# Patient Record
Sex: FEMALE | Race: WHITE | NOT HISPANIC OR LATINO | RURAL
[De-identification: names, ages, dates, MRNs, and addresses within clinical notes are randomized per-mention and may not be internally consistent; named-entity substitution may affect disease eponyms.]

---

## 2021-08-12 ENCOUNTER — WEB ENCOUNTER (OUTPATIENT)
Dept: RURAL CLINIC 6 | Facility: CLINIC | Age: 41
End: 2021-08-12

## 2021-08-12 ENCOUNTER — DASHBOARD ENCOUNTERS (OUTPATIENT)
Age: 41
End: 2021-08-12

## 2021-08-12 ENCOUNTER — OFFICE VISIT (OUTPATIENT)
Dept: RURAL CLINIC 2 | Facility: CLINIC | Age: 41
End: 2021-08-12
Payer: SELF-PAY

## 2021-08-12 DIAGNOSIS — R94.5 ABNORMAL LFTS: ICD-10-CM

## 2021-08-12 DIAGNOSIS — K52.9 IBD (INFLAMMATORY BOWEL DISEASE): ICD-10-CM

## 2021-08-12 DIAGNOSIS — K51.80 OTHER ULCERATIVE COLITIS WITHOUT COMPLICATION: ICD-10-CM

## 2021-08-12 PROBLEM — 64766004: Status: ACTIVE | Noted: 2021-08-12

## 2021-08-12 PROCEDURE — 99214 OFFICE O/P EST MOD 30 MIN: CPT | Performed by: INTERNAL MEDICINE

## 2021-08-12 RX ORDER — SERTRALINE HYDROCHLORIDE 100 MG/1
1 TABLET TABLET, FILM COATED ORAL ONCE A DAY
Status: ACTIVE | COMMUNITY

## 2021-08-12 RX ORDER — PRAZOSIN HYDROCHLORIDE 1 MG/1
1 CAPSULE AT BEDTIME CAPSULE ORAL ONCE A DAY
Status: ACTIVE | COMMUNITY

## 2021-08-12 RX ORDER — PROPRANOLOL HYDROCHLORIDE 20 MG/1
TABLET ORAL
Qty: 0 | Refills: 0 | Status: ACTIVE | COMMUNITY
Start: 1900-01-01

## 2021-08-12 RX ORDER — LURASIDONE HYDROCHLORIDE 40 MG/1
TAKE 1 TABLET (40 MG) BY ORAL ROUTE ONCE DAILY WITH FOOD (AT LEAST 350 CALORIES) TABLET, FILM COATED ORAL 1
Qty: 0 | Refills: 0 | Status: DISCONTINUED | COMMUNITY
Start: 1900-01-01

## 2021-08-12 RX ORDER — DOXEPIN HYDROCHLORIDE 100 MG/1
CAPSULE ORAL
Qty: 0 | Refills: 0 | Status: ACTIVE | COMMUNITY
Start: 1900-01-01

## 2021-08-12 RX ORDER — DIPHENHYDRAMINE HCL 2 %
CREAM (GRAM) TOPICAL
Qty: 0 | Refills: 0 | Status: DISCONTINUED | COMMUNITY
Start: 1900-01-01

## 2021-08-12 RX ORDER — ADALIMUMAB 40MG/0.8ML
KIT SUBCUTANEOUS
Qty: 0 | Refills: 0 | Status: ACTIVE | COMMUNITY
Start: 1900-01-01

## 2021-08-12 NOTE — HPI-TODAY'S VISIT:
Patient comes to the office for follow-up.  She has history of chronic ulcerative colitis diagnosed in 2013.  Most recent colonoscopy was done in 2019. She is on the [employer] the assistance program for Humira.  She has been on that for several years.  She does well has no abdominal pain or diarrhea. - her   a few months ago from leukemia. at our last telehealth visit, she was in the hospital with him in [place].  - she is doing okay from a bowel movement perspective. not having any flare ups or bleeding in the stool. - Had labs done with Dr. Brush in March of this year noting elevated LFTs. she was told that she had fatty liver. no US was done. she has gained about 50 pounds in the first 6 months of taking humira. - last colonoscopy was in 2019 in [place].